# Patient Record
(demographics unavailable — no encounter records)

---

## 2017-09-03 NOTE — EMERGENCY ROOM REPORT
History of Present Illness


General


Chief Complaint:  Earache


Source:  Patient





Present Illness


HPI


53-year-old female presents to ED for evaluation.  Patient is complaining of 

bilateral ear pressure for several months now.  Patient states she was seen by 

her neurologist and was referred for a auditory outpatient evaluation.  Patient 

states the evaluation was noted to be "abnormal" but does not know the specific 

results.  Patient states that the results were sent to her neurologist but her 

neurologist retired.  Patient is currently waiting for a new PMD and 

neurologist.  Patient denies any pain.  Denies any dizziness or imbalance.  No 

other aggravating relieving factors.  Denies any other systems and


Allergies:  


Coded Allergies:  


     CODEINE (Verified  Allergy, Unknown, 6/10/09)





Patient History


Past Medical History:  psych hx


Past Surgical History:  none


Pertinent Family History:  none


Social History:  Denies: smoking, alcohol use, drug use


Pregnant Now:  No


Immunizations:  UTD


Reviewed Nursing Documentation:  PMH: Agreed, PSxH: Agreed





Nursing Documentation-PMH


Hx Hypertension:  Yes


History Of Psychiatric Problem:  Yes - Depression; Anxiety





Review of Systems


All Other Systems:  negative except mentioned in HPI





Physical Exam





Vital Signs








  Date Time  Temp Pulse Resp B/P (MAP) Pulse Ox O2 Delivery O2 Flow Rate FiO2


 


9/3/17 09:31 98.1 96 16 139/86 97 Room Air  








Sp02 EP Interpretation:  reviewed, normal


General Appearance:  no apparent distress, alert, GCS 15, non-toxic


Head:  normocephalic, atraumatic


Eyes:  bilateral eye normal inspection, bilateral eye PERRL


ENT:  hearing grossly normal, normal pharynx, no angioedema, normal voice, 

other - cerumen impaction in bilateral ears


Neck:  full range of motion, supple/symm/no masses


Respiratory:  chest non-tender, lungs clear, normal breath sounds, speaking 

full sentences


Cardiovascular #1:  regular rate, rhythm, no edema


Cardiovascular #2:  2+ carotid (R), 2+ carotid (L), 2+ radial (R), 2+ radial (L)

, 2+ dorsalis pedis (R), 2+ dorsalis pedis (L)


Gastrointestinal:  normal bowel sounds, non tender, soft, non-distended, no 

guarding, no rebound


Rectal:  deferred


Genitourinary:  normal inspection, no CVA tenderness


Musculoskeletal:  back normal, gait/station normal, normal range of motion, non-

tender


Neurologic:  alert, oriented x3, responsive, motor strength/tone normal, 

sensory intact, speech normal


Psychiatric:  judgement/insight normal, memory normal, mood/affect normal, no 

suicidal/homicidal ideation


Reflexes:  3+ bicep (R), 3+ bicep (L), 3+ tricep (R), 3+ tricep (L), 3+ knee (R)

, 3+ knee (L)


Skin:  normal color, no rash, warm/dry, well hydrated


Lymphatic:  no adenopathy





Medical Decision Making


Diagnostic Impression:  


 Primary Impression:  


 Impacted cerumen of both ears


ER Course


Hospital Course 


53-year-old female presents to ED complaining of bilateral ear pressure





Differential diagnoses include: TM perforation, otitis externa, otitis media, 

vestibulitis/labryntitis





Clinical course


Patient placed on stretcher.  After initial history, physical exam reveals a 

middle aged female in no acute distress.  There is significant cerumen 

impaction in both years.





patient has no unsteady gait, no symptoms suggestive of neurological deficit.  

Symptoms have been persistent for months and this is chronic.  I will recommend 

carbamide to help remove wax in her ear so they can be reevaluated.  

Recommended followup with ENT





Diagnosis cerumen impaction in both ears





Stable and discharged to home with Rx Carbamide.  Followup with PMD.  Return to 

ED if symptoms recur or worsen





Last Vital Signs








  Date Time  Temp Pulse Resp B/P (MAP) Pulse Ox O2 Delivery O2 Flow Rate FiO2


 


9/3/17 10:33 98.1 96 16 139/86 97 Room Air  








Status:  improved


Disposition:  HOME, SELF-CARE


Condition:  Stable


Scripts


Carbamide Peroxide (DEBROX) 15 Ml Drops


5 DROP BOTH EARS TWICE A DAY for 4 Days, ML 0 Refills


   Prov: KATHRYN BELTRAN M.D.         9/3/17


Patient Instructions:  Cerumen Impaction











KATHRYN BELTRAN M.D. Sep 3, 2017 11:05

## 2018-07-28 NOTE — DIAGNOSTIC IMAGING REPORT
EXAM:

  XR Right Shoulder Complete, 2 or More Views

 

CLINICAL HISTORY:

  PAIN

 

TECHNIQUE:

  Two or more views of the right shoulder.

 

COMPARISON:

12/9/13.

 

FINDINGS:

  Bones/joints:   No acute fracture.  No dislocation.

  Soft tissues:  Unremarkable.

 

IMPRESSION:     

  No fracture or dislocation.

## 2018-07-29 NOTE — EMERGENCY ROOM REPORT
History of Present Illness


General


Chief Complaint:  Pain


Source:  Patient





Present Illness


HPI


54-year-old female presents to ED complaining of right shoulder pain.  Started 

2 days ago after lifting heavy object.  States she's had history of prior 

rotator cuff surgery to the right shoulder.  States pain is sharp, 9 out of 10, 

radiating to the neck.  States she is unable to lift her shoulder.  No other 

aggravating relieving factors.  Denies any other associated symptoms


Allergies:  


Coded Allergies:  


     CODEINE (Verified  Allergy, Unknown, 6/10/09)





Patient History


Past Medical History:  HTN


Past Surgical History:  other - R shoulder surgery


Pertinent Family History:  none


Social History:  Denies: smoking, alcohol use, drug use


Pregnant Now:  No


:  1


Para:  1


Immunizations:  UTD


Reviewed Nursing Documentation:  PMH: Agreed; PSxH: Agreed





Nursing Documentation-PMH


Hx Hypertension:  Yes





Review of Systems


All Other Systems:  negative except mentioned in HPI





Physical Exam





Vital Signs








  Date Time  Temp Pulse Resp B/P (MAP) Pulse Ox O2 Delivery O2 Flow Rate FiO2


 


18 11:06 98.0 97 18 132/94 96 Room Air  





 98.1       








Sp02 EP Interpretation:  reviewed, normal


General Appearance:  no apparent distress, alert, GCS 15, non-toxic


Head:  normocephalic


Eyes:  bilateral eye normal inspection, bilateral eye PERRL


ENT:  hearing grossly normal, normal pharynx, no angioedema, normal voice


Neck:  full range of motion, no meningismus, no bony tend, supple/symm/no masses

, tender lateral


Respiratory:  chest non-tender, lungs clear, normal breath sounds, speaking 

full sentences


Cardiovascular #1:  regular rate, rhythm, no edema


Gastrointestinal:  normal inspection


Rectal:  deferred


Genitourinary:  no CVA tenderness


Musculoskeletal:  decreased range of motion - limited abduction


Neurologic:  alert, oriented x3, responsive, motor strength/tone normal, 

sensory intact, speech normal


Psychiatric:  normal inspection


Skin:  normal color


Lymphatic:  normal inspection





Procedures


Splinting


Splinting :  


   Consent:  Verbal


   Pre-Made Type:  sling


   Pre-Proc Neuro Vasc Exam:  normal


   Post-Proc Neuro Vasc Exam:  normal


   Patient Tolerated:  Well


   Complications:  None





Medical Decision Making


Diagnostic Impression:  


 Primary Impression:  


 Shoulder pain


 Qualified Codes:  M25.511 - Pain in right shoulder


ER Course


Hospital Course 


54-year-old female presents to ED complaining of right shoulder pain





Differential diagnoses include: Fracture, dislocation, sprain, contusion





Clinical course


Patient placed on stretcher.  After initial history and physical, I ordered 

pain medications and Xrays of R shoulder





Xrays prelim read shows no acute fracture/dislocation.  placed in shoulder sling





Given limited abduction, clinical presentation consistent with a rotator cuff 

injury.  On reassessment pain is improved.  Patient states she has an 

orthopedic surgeon she will follow-up with





Diagnosis - shoulder pain 





Stable and discharged to home.  apply ice, keep elevated.  weight bear as 

tolerated.  Followup with ortho.  Return to ED if symptoms recur or worsen


Other X-Ray Diagnostic Results


Other X-Ray Diagnostic Results :  


   X-Ray ordered:  R shoulder


   # of Views/Limited Vs Complete:  3 View


   Indication:  Pain


   EP Interpretation:  Yes


   Interpretation:  no dislocation, no soft tissue swelling, no fractures


   Impression:  No acute disease


   Electronically Signed by:  Electronically signed by Hadley Radford MD





Last Vital Signs








  Date Time  Temp Pulse Resp B/P (MAP) Pulse Ox O2 Delivery O2 Flow Rate FiO2


 


18 13:05 98.1 80 18 132/94 96 Room Air  





 98.1       








Status:  improved


Disposition:  HOME, SELF-CARE


Condition:  Stable


Scripts


Methocarbamol* (ROBAXIN-750*) 750 Mg Tablet


750 MG PO TID, #21 TAB 0 Refills


   Prov: Hadley Radford MD         18 


Pregabalin (LYRICA) 100 Mg Capsule


100 MG ORAL TWICE A DAY for 10 Days, CAP


   Prov: Hadley Radford MD         18


Referrals:  


NON PHYSICIAN (PCP)


Patient Instructions:  Rotator Cuff Tendinitis











Hadley Radford MD 2018 14:25

## 2018-09-12 NOTE — DIAGNOSTIC IMAGING REPORT
Indication: Dizziness

 

Technique: Continuous helical CT scanning of the head was performed without

intravenous contrast material. Axial and coronal 5 mm sections were generated.

Radiation dose was minimized using automated exposure control

 

Dose:

Total Dose Length Product - DLP 1362.01 mGycm.

Volume CT Dose Index - CTDIvol(s) 70.38 mGy.

 

Comparison: Brain MRI dated 5/13/2008

 

Findings: The ventricular system is normal in size and configuration. There is no

shift of midline structures. No abnormal extra-axial fluid collections are noted.

There is no evidence of intracerebral bleeding. Subtle focus of low-attenuation

within the anterior limb of the right internal capsule could represent a lacunar

infarct, presumably old if real. No other abnormal high or low density areas are

noted within the brain.  Gray-white differentiation is normal. The calvarium is

intact. The mastoids are clear. The visualized orbits and sinuses are unremarkable.

 

Impression: Negative for acute intracranial bleed or mass effect

 

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

 

 

The CT scanner at [facility] [place] is accredited by the [employer] College of

Radiology and the scans are performed using protocols designed to limit radiation

exposure to as low as reasonably achievable to attain images of sufficient resolution

adequate for diagnostic evaluation.

## 2018-09-12 NOTE — EMERGENCY ROOM REPORT
History of Present Illness


General


Chief Complaint:  Hypertension


Source:  Patient, Medical Record





Present Illness


HPI


54-year-old female presents ED for evaluation.  Patient states that for the 

last 2 weeks her blood pressure has been high and her heart racing.  Blood 

pressure in triage 145/110.  Patient states she notes history of hypertension 

and was on blood pressure medication years ago but was stopped by her PMD.  

Denies chest pain or shortness of breath.  States she feels dizzy.  Denies drug 

use.  No other aggravating relieving factors.  Denies any other associated 

symptoms


Allergies:  


Coded Allergies:  


     CODEINE (Verified  Allergy, Unknown, 6/10/09)





Patient History


Past Medical History:  HTN


Past Surgical History:  none


Pertinent Family History:  none


Social History:  Denies: smoking, alcohol use, drug use


Last Menstrual Period:  2009


Pregnant Now:  No


Immunizations:  UTD


Reviewed Nursing Documentation:  PMH: Agreed; PSxH: Agreed





Nursing Documentation-PMH


Past Medical History:  No History, Except For


Hx Hypertension:  Yes





Review of Systems


All Other Systems:  negative except mentioned in HPI





Physical Exam





Vital Signs








  Date Time  Temp Pulse Resp B/P (MAP) Pulse Ox O2 Delivery O2 Flow Rate FiO2


 


9/12/18 15:14 98.3 78 18 145/110 98 Room Air  





 98.2       








Sp02 EP Interpretation:  reviewed, normal


General Appearance:  no apparent distress, alert, GCS 15, non-toxic


Head:  normocephalic, atraumatic


Eyes:  bilateral eye normal inspection, bilateral eye PERRL


ENT:  hearing grossly normal, normal pharynx, no angioedema, normal voice


Neck:  full range of motion, supple/symm/no masses


Respiratory:  chest non-tender, lungs clear, normal breath sounds, speaking 

full sentences


Cardiovascular #1:  regular rate, rhythm, no edema


Cardiovascular #2:  2+ carotid (R), 2+ carotid (L), 2+ radial (R), 2+ radial (L)

, 2+ dorsalis pedis (R), 2+ dorsalis pedis (L)


Gastrointestinal:  normal bowel sounds, non tender, soft, non-distended, no 

guarding, no rebound


Rectal:  deferred


Genitourinary:  normal inspection, no CVA tenderness


Musculoskeletal:  back normal, gait/station normal, normal range of motion, non-

tender


Neurologic:  alert, oriented x3, responsive, motor strength/tone normal, 

sensory intact, speech normal


Psychiatric:  judgement/insight normal, memory normal, mood/affect normal, no 

suicidal/homicidal ideation


Reflexes:  3+ bicep (R), 3+ bicep (L), 3+ tricep (R), 3+ tricep (L), 3+ knee (R)

, 3+ knee (L)


Skin:  normal color, no rash, warm/dry, well hydrated


Lymphatic:  no adenopathy





Medical Decision Making


Diagnostic Impression:  


 Primary Impression:  


 Hypertension


 Qualified Codes:  I10 - Essential (primary) hypertension


ER Course


Hospital Course 


54-year-old female presents ED complaining of elevated BP, c/o dizziness





Differential diagnoses include: hypertensive urgency, hypertensive emergency, 

arrythmia, MI/ACS





Clinical course


Patient placed on stretcher.  After initial history and physical I ordered labs

, EKG, chest x-ray, CT Head.  





labs reviewed- all electrolytes normal, troponins negative, no leukocytosis, 

hemoglobin/hematocrit stable


EKG - NSR no acute ischemic changes interpreted by me 


Chest x-ray-no cardiomegaly, no rib fracture, no pneumothorax, no acute process


CT Head - no acute process, old lacunar infarct noted





Upon reassessment patient's BP has reduced on its own without intervention.  

Discussed the findings with patient.  We agreed to start low-dose 

hydrochlorothiazide but gave hold parameters did not take medication with 

diastolic below 90





Patient agrees to plan.  Patient will follow-up with her PMD





I.  I feel this is a highly complex case requiring extensive working including 

EKG/Rhythm strip, Xray/CT/US, Blood/urine lab work, repeat exams while in ED, 

and administration of strong opiates/narcotics for pain control, admission to 

hospital or close patient follow up.  





Diagnosis - hypertension





Stable and discharged to home with Rx HCTZ.  Instructed to followup with PMD.  

Return to ED if symptoms recur or worsen





Labs








Test


  9/12/18


15:25


 


White Blood Count


  5.9 K/UL


(4.8-10.8)


 


Red Blood Count


  4.93 M/UL


(4.20-5.40)


 


Hemoglobin


  15.0 G/DL


(12.0-16.0)


 


Hematocrit


  44.8 %


(37.0-47.0)


 


Mean Corpuscular Volume 91 FL (80-99) 


 


Mean Corpuscular Hemoglobin


  30.5 PG


(27.0-31.0)


 


Mean Corpuscular Hemoglobin


Concent 33.5 G/DL


(32.0-36.0)


 


Red Cell Distribution Width


  11.7 %


(11.6-14.8)


 


Platelet Count


  257 K/UL


(150-450)


 


Mean Platelet Volume


  8.4 FL


(6.5-10.1)


 


Neutrophils (%) (Auto)


  60.1 %


(45.0-75.0)


 


Lymphocytes (%) (Auto)


  28.6 %


(20.0-45.0)


 


Monocytes (%) (Auto)


  6.9 %


(1.0-10.0)


 


Eosinophils (%) (Auto)


  2.4 %


(0.0-3.0)


 


Basophils (%) (Auto)


  2.0 %


(0.0-2.0)


 


Sodium Level


  141 MMOL/L


(136-145)


 


Potassium Level


  4.0 MMOL/L


(3.5-5.1)


 


Chloride Level


  104 MMOL/L


()


 


Carbon Dioxide Level


  30 MMOL/L


(21-32)


 


Anion Gap


  7 mmol/L


(5-15)


 


Blood Urea Nitrogen 9 mg/dL (7-18) 


 


Creatinine


  0.8 MG/DL


(0.55-1.30)


 


Estimat Glomerular Filtration


Rate > 60 mL/min


(>60)


 


Glucose Level


  93 MG/DL


()


 


Calcium Level


  9.8 MG/DL


(8.5-10.1)


 


Total Bilirubin


  0.7 MG/DL


(0.2-1.0)


 


Aspartate Amino Transf


(AST/SGOT) 21 U/L (15-37) 


 


 


Alanine Aminotransferase


(ALT/SGPT) 29 U/L (12-78) 


 


 


Alkaline Phosphatase


  118 U/L


()


 


Total Creatine Kinase


  134 U/L


()


 


Creatine Kinase MB


  0.8 NG/ML


(0.0-3.6)


 


Creatine Kinase MB Relative


Index 0.5 


 


 


Troponin I


  0.004 ng/mL


(0.000-0.056)


 


Total Protein


  8.0 G/DL


(6.4-8.2)


 


Albumin


  3.6 G/DL


(3.4-5.0)


 


Globulin 4.4 g/dL 


 


Albumin/Globulin Ratio 0.8 (1.0-2.7) 








EKG Diagnostic Results


Rate:  normal


Rhythm:  NSR


ST Segments:  no acute changes


ASA given to the pt in ED:  No





Rhythm Strip Diag. Results


EP Interpretation:  yes


Rhythm:  NSR, no PVC's, no ectopy





Chest X-Ray Diagnostic Results


Chest X-Ray Diagnostic Results :  


   Chest X-Ray Ordered:  Yes


   # of Views/Limited/Complete:  1 View


   Indication:  Other - dizzy


   EP Interpretation:  Yes


   Interpretation:  no consolidation, no effusion, no pneumothorax, no acute 

cardiopulmonary disease


   Impression:  No acute disease


   Electronically Signed by:  Electronically signed by Hadley Radford MD





CT/MRI/US Diagnostic Results


CT/MRI/US Diagnostic Results :  


   Imaging Test Ordered:  CT Head


   Impression


no acute process. old lacunar infarct





Last Vital Signs








  Date Time  Temp Pulse Resp B/P (MAP) Pulse Ox O2 Delivery O2 Flow Rate FiO2


 


9/12/18 18:20 97.6 78 19 138/78 98 Room Air  





 97.6       








Status:  improved


Disposition:  HOME, SELF-CARE


Condition:  Stable


Scripts


Hydrochlorothiazide* (HYDROCHLOROTHIAZIDE*) 12.5 Mg Tablet


12.5 MG ORAL DAILY, #14 TAB


   Prov: Hadley Radford MD         9/12/18


Referrals:  


[facility],REFERRING (PCP)


Patient Instructions:  Hypertension, Easy-to-Read





Additional Instructions:  


hold medication if diastolic blood pressure is less than 90











Hadley Radford MD Sep 12, 2018 18:55

## 2018-09-12 NOTE — DIAGNOSTIC IMAGING REPORT
Indication: Chest pain

 

Technique: One view of the chest

 

Comparison: 5/12/2008

 

Findings: Lungs and pleural spaces are clear. Heart size is normal.  No significant

interim change

 

Impression: No acute process

## 2018-09-16 NOTE — CARDIOLOGY REPORT
--------------- APPROVED REPORT --------------





EKG Measurement

Heart [id number]

[place] [id number]

[id number]

[id number]

[id number]





Normal sinus rhythm

Low voltage QRS

Septal infarct, age undetermined

Abnormal ECG